# Patient Record
Sex: MALE | Race: WHITE | URBAN - METROPOLITAN AREA
[De-identification: names, ages, dates, MRNs, and addresses within clinical notes are randomized per-mention and may not be internally consistent; named-entity substitution may affect disease eponyms.]

---

## 2017-09-14 NOTE — PATIENT DISCUSSION
POSTERIOR SYNECHIAE, OS: STABLE. FOLLOW FOR CHANGES. PATIENT TO CALL IF ANY SYMPTOMS OF UVEITIS OCCUR.

## 2020-07-15 ENCOUNTER — NEW REFERRAL (OUTPATIENT)
Dept: URBAN - METROPOLITAN AREA CLINIC 33 | Facility: CLINIC | Age: 60
End: 2020-07-15

## 2020-07-15 VITALS — WEIGHT: 225 LBS | HEIGHT: 73 IN | BODY MASS INDEX: 29.82 KG/M2

## 2020-07-15 DIAGNOSIS — H33.011: ICD-10-CM

## 2020-07-15 DIAGNOSIS — H43.811: ICD-10-CM

## 2020-07-15 DIAGNOSIS — H43.392: ICD-10-CM

## 2020-07-15 DIAGNOSIS — H35.363: ICD-10-CM

## 2020-07-15 PROCEDURE — 99204 OFFICE O/P NEW MOD 45 MIN: CPT

## 2020-07-15 PROCEDURE — 67105 REPAIR DETACHED RETINA PC: CPT

## 2020-07-15 PROCEDURE — 92250 FUNDUS PHOTOGRAPHY W/I&R: CPT

## 2020-07-15 ASSESSMENT — VISUAL ACUITY
OD_CC: 20/25-1
OS_CC: 20/25

## 2020-07-15 ASSESSMENT — TONOMETRY
OD_IOP_MMHG: 16
OS_IOP_MMHG: 15

## 2020-07-29 ENCOUNTER — POST OP-DILATION (OUTPATIENT)
Dept: URBAN - METROPOLITAN AREA CLINIC 33 | Facility: CLINIC | Age: 60
End: 2020-07-29

## 2020-07-29 DIAGNOSIS — H43.392: ICD-10-CM

## 2020-07-29 DIAGNOSIS — H43.811: ICD-10-CM

## 2020-07-29 DIAGNOSIS — H35.363: ICD-10-CM

## 2020-07-29 DIAGNOSIS — H33.011: ICD-10-CM

## 2020-07-29 PROCEDURE — 92201 OPSCPY EXTND RTA DRAW UNI/BI: CPT

## 2020-07-29 PROCEDURE — 99024 POSTOP FOLLOW-UP VISIT: CPT

## 2020-07-29 ASSESSMENT — TONOMETRY
OS_IOP_MMHG: 13
OD_IOP_MMHG: 14

## 2020-07-29 ASSESSMENT — VISUAL ACUITY
OD_CC: 20/25+2
OS_CC: 20/25+2

## 2020-08-12 ENCOUNTER — POST OP-DILATION (OUTPATIENT)
Dept: URBAN - METROPOLITAN AREA CLINIC 33 | Facility: CLINIC | Age: 60
End: 2020-08-12

## 2020-08-12 DIAGNOSIS — H33.011: ICD-10-CM

## 2020-08-12 DIAGNOSIS — H35.363: ICD-10-CM

## 2020-08-12 DIAGNOSIS — H43.811: ICD-10-CM

## 2020-08-12 DIAGNOSIS — H43.392: ICD-10-CM

## 2020-08-12 PROCEDURE — 92250 FUNDUS PHOTOGRAPHY W/I&R: CPT

## 2020-08-12 PROCEDURE — 99024 POSTOP FOLLOW-UP VISIT: CPT

## 2020-08-12 ASSESSMENT — TONOMETRY
OD_IOP_MMHG: 14
OS_IOP_MMHG: 14

## 2020-08-12 ASSESSMENT — VISUAL ACUITY
OD_CC: 20/25+1
OS_CC: 20/25+2

## 2021-01-27 ENCOUNTER — FOLLOW UP (OUTPATIENT)
Dept: URBAN - METROPOLITAN AREA CLINIC 33 | Facility: CLINIC | Age: 61
End: 2021-01-27

## 2021-01-27 DIAGNOSIS — H43.392: ICD-10-CM

## 2021-01-27 DIAGNOSIS — H43.811: ICD-10-CM

## 2021-01-27 DIAGNOSIS — H33.41: ICD-10-CM

## 2021-01-27 DIAGNOSIS — H35.363: ICD-10-CM

## 2021-01-27 PROCEDURE — 92014 COMPRE OPH EXAM EST PT 1/>: CPT

## 2021-01-27 PROCEDURE — 92250 FUNDUS PHOTOGRAPHY W/I&R: CPT

## 2021-01-27 RX ORDER — PREDNISOLONE ACETATE 10 MG/ML: 1 SUSPENSION/ DROPS OPHTHALMIC

## 2021-01-27 ASSESSMENT — TONOMETRY
OD_IOP_MMHG: 14
OS_IOP_MMHG: 12

## 2021-01-27 ASSESSMENT — VISUAL ACUITY: OS_CC: 20/20-1

## 2021-01-28 ENCOUNTER — SURGERY/PROCEDURE (OUTPATIENT)
Dept: URBAN - METROPOLITAN AREA SURGERY 10 | Facility: SURGERY | Age: 61
End: 2021-01-28

## 2021-01-28 DIAGNOSIS — H33.41: ICD-10-CM

## 2021-01-28 PROCEDURE — 67113 REPAIR RETINAL DETACH CPLX: CPT

## 2021-01-29 ENCOUNTER — POST-OP CHECK (OUTPATIENT)
Dept: URBAN - METROPOLITAN AREA CLINIC 33 | Facility: CLINIC | Age: 61
End: 2021-01-29

## 2021-01-29 VITALS — WEIGHT: 232 LBS | BODY MASS INDEX: 30.75 KG/M2 | HEIGHT: 73 IN

## 2021-01-29 DIAGNOSIS — H33.41: ICD-10-CM

## 2021-01-29 DIAGNOSIS — H43.392: ICD-10-CM

## 2021-01-29 DIAGNOSIS — Z98.890: ICD-10-CM

## 2021-01-29 DIAGNOSIS — H43.811: ICD-10-CM

## 2021-01-29 DIAGNOSIS — H35.363: ICD-10-CM

## 2021-01-29 DIAGNOSIS — H33.011: ICD-10-CM

## 2021-01-29 DIAGNOSIS — H25.13: ICD-10-CM

## 2021-01-29 PROCEDURE — 99024 POSTOP FOLLOW-UP VISIT: CPT

## 2021-01-29 ASSESSMENT — VISUAL ACUITY: OD_SC: CF 2FT

## 2021-01-29 ASSESSMENT — TONOMETRY: OD_IOP_MMHG: 17

## 2021-02-01 ENCOUNTER — POST-OP CHECK (OUTPATIENT)
Dept: URBAN - METROPOLITAN AREA CLINIC 33 | Facility: CLINIC | Age: 61
End: 2021-02-01

## 2021-02-01 DIAGNOSIS — Z98.890: ICD-10-CM

## 2021-02-01 DIAGNOSIS — H33.011: ICD-10-CM

## 2021-02-01 DIAGNOSIS — H33.41: ICD-10-CM

## 2021-02-01 DIAGNOSIS — H35.363: ICD-10-CM

## 2021-02-01 PROCEDURE — 92250 FUNDUS PHOTOGRAPHY W/I&R: CPT

## 2021-02-01 PROCEDURE — 99024 POSTOP FOLLOW-UP VISIT: CPT

## 2021-02-01 ASSESSMENT — VISUAL ACUITY
OD_CC: CF 2FT
OS_CC: 20/20-2
OD_PH: 20/400+2

## 2021-02-01 ASSESSMENT — TONOMETRY
OS_IOP_MMHG: 14
OD_IOP_MMHG: 13

## 2021-02-17 ENCOUNTER — POST OP-DILATION (OUTPATIENT)
Dept: URBAN - METROPOLITAN AREA CLINIC 33 | Facility: CLINIC | Age: 61
End: 2021-02-17

## 2021-02-17 DIAGNOSIS — Z98.890: ICD-10-CM

## 2021-02-17 DIAGNOSIS — H33.41: ICD-10-CM

## 2021-02-17 DIAGNOSIS — H35.363: ICD-10-CM

## 2021-02-17 DIAGNOSIS — H33.011: ICD-10-CM

## 2021-02-17 PROCEDURE — 99024 POSTOP FOLLOW-UP VISIT: CPT

## 2021-02-17 PROCEDURE — 92134 CPTRZ OPH DX IMG PST SGM RTA: CPT

## 2021-02-17 ASSESSMENT — VISUAL ACUITY
OS_CC: 20/20-1
OD_SC: 20/200
OD_CC: 20/400+1

## 2021-02-17 ASSESSMENT — TONOMETRY
OD_IOP_MMHG: 17
OS_IOP_MMHG: 14

## 2021-03-22 ENCOUNTER — POST OP-DILATION (OUTPATIENT)
Dept: URBAN - METROPOLITAN AREA CLINIC 33 | Facility: CLINIC | Age: 61
End: 2021-03-22

## 2021-03-22 DIAGNOSIS — H33.011: ICD-10-CM

## 2021-03-22 DIAGNOSIS — H33.41: ICD-10-CM

## 2021-03-22 DIAGNOSIS — Z98.890: ICD-10-CM

## 2021-03-22 DIAGNOSIS — H35.363: ICD-10-CM

## 2021-03-22 PROCEDURE — 99024 POSTOP FOLLOW-UP VISIT: CPT

## 2021-03-22 PROCEDURE — 92250 FUNDUS PHOTOGRAPHY W/I&R: CPT

## 2021-03-22 ASSESSMENT — TONOMETRY
OS_IOP_MMHG: 17
OD_IOP_MMHG: 35

## 2021-03-22 ASSESSMENT — VISUAL ACUITY
OD_SC: CF 4FT
OS_CC: 20/20-1

## 2021-05-10 ENCOUNTER — FOLLOW UP (OUTPATIENT)
Dept: URBAN - METROPOLITAN AREA CLINIC 33 | Facility: CLINIC | Age: 61
End: 2021-05-10

## 2021-05-10 DIAGNOSIS — H33.011: ICD-10-CM

## 2021-05-10 DIAGNOSIS — H35.363: ICD-10-CM

## 2021-05-10 DIAGNOSIS — H33.41: ICD-10-CM

## 2021-05-10 DIAGNOSIS — H43.392: ICD-10-CM

## 2021-05-10 PROCEDURE — 92014 COMPRE OPH EXAM EST PT 1/>: CPT

## 2021-05-10 PROCEDURE — 92250 FUNDUS PHOTOGRAPHY W/I&R: CPT

## 2021-05-10 RX ORDER — PREDNISOLONE ACETATE 10 MG/ML: 1 SUSPENSION/ DROPS OPHTHALMIC

## 2021-05-10 RX ORDER — TOBRAMYCIN 3 MG/ML: 1 SOLUTION/ DROPS OPHTHALMIC

## 2021-05-10 ASSESSMENT — TONOMETRY
OD_IOP_MMHG: 11
OS_IOP_MMHG: 13

## 2021-05-10 ASSESSMENT — VISUAL ACUITY
OS_CC: 20/20-2
OD_CC: CF 2FT

## 2021-05-24 ENCOUNTER — SURGERY/PROCEDURE (OUTPATIENT)
Dept: URBAN - METROPOLITAN AREA SURGERY 12 | Facility: SURGERY | Age: 61
End: 2021-05-24

## 2021-05-24 DIAGNOSIS — H33.41: ICD-10-CM

## 2021-05-24 PROCEDURE — 67121 REMOVE EYE IMPLANT MATERIAL: CPT

## 2021-05-25 ENCOUNTER — POST OP-DILATION (OUTPATIENT)
Dept: URBAN - METROPOLITAN AREA CLINIC 33 | Facility: CLINIC | Age: 61
End: 2021-05-25

## 2021-05-25 DIAGNOSIS — Z98.890: ICD-10-CM

## 2021-05-25 DIAGNOSIS — H33.011: ICD-10-CM

## 2021-05-25 PROCEDURE — 99024 POSTOP FOLLOW-UP VISIT: CPT

## 2021-05-25 ASSESSMENT — VISUAL ACUITY: OS_CC: 20/20-1

## 2021-05-25 ASSESSMENT — TONOMETRY
OS_IOP_MMHG: 20
OD_IOP_MMHG: 21

## 2021-06-02 ENCOUNTER — POST OP-DILATION (OUTPATIENT)
Dept: URBAN - METROPOLITAN AREA CLINIC 33 | Facility: CLINIC | Age: 61
End: 2021-06-02

## 2021-06-02 DIAGNOSIS — Z98.890: ICD-10-CM

## 2021-06-02 DIAGNOSIS — H33.011: ICD-10-CM

## 2021-06-02 PROCEDURE — 99024 POSTOP FOLLOW-UP VISIT: CPT

## 2021-06-02 ASSESSMENT — VISUAL ACUITY
OS_CC: 20/20-1
OD_CC: CF 2FT

## 2021-06-02 ASSESSMENT — TONOMETRY
OS_IOP_MMHG: 18
OD_IOP_MMHG: 26

## 2021-06-09 ENCOUNTER — POST OP-DILATION (OUTPATIENT)
Dept: URBAN - METROPOLITAN AREA CLINIC 33 | Facility: CLINIC | Age: 61
End: 2021-06-09

## 2021-06-09 DIAGNOSIS — Z98.890: ICD-10-CM

## 2021-06-09 DIAGNOSIS — H33.011: ICD-10-CM

## 2021-06-09 PROCEDURE — 99024 POSTOP FOLLOW-UP VISIT: CPT

## 2021-06-09 PROCEDURE — 76512 OPH US DX B-SCAN: CPT

## 2021-06-09 ASSESSMENT — VISUAL ACUITY
OS_CC: 20/20-2
OD_CC: 20/400
OD_SC: 20/400

## 2021-06-09 ASSESSMENT — TONOMETRY
OS_IOP_MMHG: 17
OD_IOP_MMHG: 23

## 2022-07-09 ENCOUNTER — TELEPHONE ENCOUNTER (OUTPATIENT)
Dept: URBAN - METROPOLITAN AREA CLINIC 121 | Facility: CLINIC | Age: 62
End: 2022-07-09

## 2022-07-10 ENCOUNTER — TELEPHONE ENCOUNTER (OUTPATIENT)
Dept: URBAN - METROPOLITAN AREA CLINIC 121 | Facility: CLINIC | Age: 62
End: 2022-07-10